# Patient Record
Sex: FEMALE | URBAN - METROPOLITAN AREA
[De-identification: names, ages, dates, MRNs, and addresses within clinical notes are randomized per-mention and may not be internally consistent; named-entity substitution may affect disease eponyms.]

---

## 2023-01-09 ENCOUNTER — LAB REQUISITION (OUTPATIENT)
Dept: OCCUPATIONAL MEDICINE | Age: 43
End: 2023-01-09

## 2023-01-09 DIAGNOSIS — Z13.9 ENCOUNTER FOR SCREENING, UNSPECIFIED: ICD-10-CM

## 2023-01-09 PROCEDURE — PSEU9049 QUANTIFERON TB PLUS: Performed by: CLINICAL MEDICAL LABORATORY

## 2023-01-09 PROCEDURE — 86480 TB TEST CELL IMMUN MEASURE: CPT | Performed by: CLINICAL MEDICAL LABORATORY

## 2023-01-11 LAB
GAMMA INTERFERON BACKGROUND BLD IA-ACNC: 0.07 IU/ML
M TB IFN-G BLD-IMP: NEGATIVE
M TB IFN-G CD4+ BCKGRND COR BLD-ACNC: 0.05 IU/ML
M TB IFN-G CD4+CD8+ BCKGRND COR BLD-ACNC: 0.05 IU/ML
MITOGEN IGNF BCKGRD COR BLD-ACNC: 2.48 IU/ML

## 2024-09-29 ENCOUNTER — APPOINTMENT (OUTPATIENT)
Dept: GENERAL RADIOLOGY | Facility: HOSPITAL | Age: 44
End: 2024-09-29
Attending: EMERGENCY MEDICINE
Payer: COMMERCIAL

## 2024-09-29 ENCOUNTER — HOSPITAL ENCOUNTER (EMERGENCY)
Facility: HOSPITAL | Age: 44
End: 2024-09-29
Attending: EMERGENCY MEDICINE
Payer: COMMERCIAL

## 2024-09-29 LAB
BASOPHILS # BLD: 0 X10(3) UL (ref 0–0.2)
BASOPHILS NFR BLD: 0 %
DEPRECATED RDW RBC AUTO: 42.2 FL (ref 35.1–46.3)
EOSINOPHIL # BLD: 0 X10(3) UL (ref 0–0.7)
EOSINOPHIL NFR BLD: 0 %
ERYTHROCYTE [DISTWIDTH] IN BLOOD BY AUTOMATED COUNT: 17.2 % (ref 11–15)
HCT VFR BLD AUTO: 34.3 %
HGB BLD-MCNC: 11.3 G/DL
LYMPHOCYTES NFR BLD: 1.85 X10(3) UL (ref 1–4)
LYMPHOCYTES NFR BLD: 9 %
MCH RBC QN AUTO: 25.2 PG (ref 26–34)
MCHC RBC AUTO-ENTMCNC: 32.9 G/DL (ref 31–37)
MCV RBC AUTO: 76.4 FL
METAMYELOCYTES # BLD: 0.21 X10(3) UL
METAMYELOCYTES NFR BLD: 1 %
MONOCYTES # BLD: 1.03 X10(3) UL (ref 0.1–1)
MONOCYTES NFR BLD: 5 %
NEUTROPHILS # BLD AUTO: 15.61 X10 (3) UL (ref 1.5–7.7)
NEUTROPHILS NFR BLD: 83 %
NEUTS BAND NFR BLD: 2 %
NEUTS HYPERSEG # BLD: 17.43 X10(3) UL (ref 1.5–7.7)
PLATELET # BLD AUTO: 353 10(3)UL (ref 150–450)
PLATELET MORPHOLOGY: NORMAL
RBC # BLD AUTO: 4.49 X10(6)UL
TOTAL CELLS COUNTED BLD: 100
WBC # BLD AUTO: 20.5 X10(3) UL (ref 4–11)

## 2024-09-29 PROCEDURE — 94640 AIRWAY INHALATION TREATMENT: CPT

## 2024-09-29 PROCEDURE — 85027 COMPLETE CBC AUTOMATED: CPT | Performed by: EMERGENCY MEDICINE

## 2024-09-29 PROCEDURE — 84484 ASSAY OF TROPONIN QUANT: CPT | Performed by: EMERGENCY MEDICINE

## 2024-09-29 PROCEDURE — 85610 PROTHROMBIN TIME: CPT | Performed by: EMERGENCY MEDICINE

## 2024-09-29 PROCEDURE — 83880 ASSAY OF NATRIURETIC PEPTIDE: CPT | Performed by: EMERGENCY MEDICINE

## 2024-09-29 PROCEDURE — 80048 BASIC METABOLIC PNL TOTAL CA: CPT | Performed by: EMERGENCY MEDICINE

## 2024-09-29 PROCEDURE — 85007 BL SMEAR W/DIFF WBC COUNT: CPT | Performed by: EMERGENCY MEDICINE

## 2024-09-29 PROCEDURE — 85025 COMPLETE CBC W/AUTO DIFF WBC: CPT | Performed by: EMERGENCY MEDICINE

## 2024-09-29 RX ORDER — IPRATROPIUM BROMIDE AND ALBUTEROL SULFATE 2.5; .5 MG/3ML; MG/3ML
3 SOLUTION RESPIRATORY (INHALATION) ONCE
Status: DISCONTINUED | OUTPATIENT
Start: 2024-09-29 | End: 2024-09-30

## 2024-09-29 NOTE — ED INITIAL ASSESSMENT (HPI)
Pt arrive per [facility] from [facility]. Pt c/o shortness of breath, cough, headache and fever. [facility] report 84% on 4L, EMS reports 88% on 7L. [facility] gave 1gm tylenol. Pt states has been sick for 13 days. SPO2 83% on RA, Placed on 7L.